# Patient Record
Sex: FEMALE | Employment: OTHER | URBAN - METROPOLITAN AREA
[De-identification: names, ages, dates, MRNs, and addresses within clinical notes are randomized per-mention and may not be internally consistent; named-entity substitution may affect disease eponyms.]

---

## 2024-03-29 ENCOUNTER — HOSPITAL ENCOUNTER (OUTPATIENT)
Facility: HOSPITAL | Age: 89
Setting detail: OBSERVATION
Discharge: HOME | End: 2024-03-30
Attending: EMERGENCY MEDICINE | Admitting: INTERNAL MEDICINE
Payer: MEDICARE

## 2024-03-29 ENCOUNTER — APPOINTMENT (OUTPATIENT)
Dept: RADIOLOGY | Facility: HOSPITAL | Age: 89
End: 2024-03-29
Payer: MEDICARE

## 2024-03-29 DIAGNOSIS — N39.0 LOWER URINARY TRACT INFECTIOUS DISEASE: Primary | ICD-10-CM

## 2024-03-29 DIAGNOSIS — K40.30 INGUINAL HERNIA OF RIGHT SIDE WITH OBSTRUCTION: ICD-10-CM

## 2024-03-29 DIAGNOSIS — R10.31 RIGHT LOWER QUADRANT ABDOMINAL PAIN: ICD-10-CM

## 2024-03-29 LAB
ALBUMIN SERPL BCP-MCNC: 3.6 G/DL (ref 3.4–5)
ALP SERPL-CCNC: 33 U/L (ref 33–136)
ALT SERPL W P-5'-P-CCNC: 12 U/L (ref 7–45)
ANION GAP SERPL CALC-SCNC: 12 MMOL/L (ref 10–20)
APPEARANCE UR: ABNORMAL
AST SERPL W P-5'-P-CCNC: 18 U/L (ref 9–39)
BACTERIA #/AREA URNS AUTO: ABNORMAL /HPF
BASOPHILS # BLD AUTO: 0.05 X10*3/UL (ref 0–0.1)
BASOPHILS NFR BLD AUTO: 0.5 %
BILIRUB SERPL-MCNC: 0.3 MG/DL (ref 0–1.2)
BILIRUB UR STRIP.AUTO-MCNC: NEGATIVE MG/DL
BUN SERPL-MCNC: 23 MG/DL (ref 6–23)
CALCIUM SERPL-MCNC: 8.1 MG/DL (ref 8.6–10.3)
CARDIAC TROPONIN I PNL SERPL HS: 10 NG/L (ref 0–13)
CHLORIDE SERPL-SCNC: 110 MMOL/L (ref 98–107)
CO2 SERPL-SCNC: 23 MMOL/L (ref 21–32)
COLOR UR: YELLOW
CREAT SERPL-MCNC: 0.93 MG/DL (ref 0.5–1.05)
EGFRCR SERPLBLD CKD-EPI 2021: 57 ML/MIN/1.73M*2
EOSINOPHIL # BLD AUTO: 0.09 X10*3/UL (ref 0–0.4)
EOSINOPHIL NFR BLD AUTO: 0.9 %
ERYTHROCYTE [DISTWIDTH] IN BLOOD BY AUTOMATED COUNT: 13.3 % (ref 11.5–14.5)
GLUCOSE SERPL-MCNC: 113 MG/DL (ref 74–99)
GLUCOSE UR STRIP.AUTO-MCNC: NEGATIVE MG/DL
HCT VFR BLD AUTO: 43 % (ref 36–46)
HGB BLD-MCNC: 13.7 G/DL (ref 12–16)
IMM GRANULOCYTES # BLD AUTO: 0.03 X10*3/UL (ref 0–0.5)
IMM GRANULOCYTES NFR BLD AUTO: 0.3 % (ref 0–0.9)
KETONES UR STRIP.AUTO-MCNC: NEGATIVE MG/DL
LEUKOCYTE ESTERASE UR QL STRIP.AUTO: ABNORMAL
LIPASE SERPL-CCNC: 29 U/L (ref 9–82)
LYMPHOCYTES # BLD AUTO: 1.99 X10*3/UL (ref 0.8–3)
LYMPHOCYTES NFR BLD AUTO: 19.8 %
MCH RBC QN AUTO: 29.8 PG (ref 26–34)
MCHC RBC AUTO-ENTMCNC: 31.9 G/DL (ref 32–36)
MCV RBC AUTO: 94 FL (ref 80–100)
MONOCYTES # BLD AUTO: 0.53 X10*3/UL (ref 0.05–0.8)
MONOCYTES NFR BLD AUTO: 5.3 %
NEUTROPHILS # BLD AUTO: 7.35 X10*3/UL (ref 1.6–5.5)
NEUTROPHILS NFR BLD AUTO: 73.2 %
NITRITE UR QL STRIP.AUTO: POSITIVE
NRBC BLD-RTO: 0 /100 WBCS (ref 0–0)
PH UR STRIP.AUTO: 7 [PH]
PLATELET # BLD AUTO: 228 X10*3/UL (ref 150–450)
POTASSIUM SERPL-SCNC: 4 MMOL/L (ref 3.5–5.3)
PROT SERPL-MCNC: 5.8 G/DL (ref 6.4–8.2)
PROT UR STRIP.AUTO-MCNC: NEGATIVE MG/DL
RBC # BLD AUTO: 4.59 X10*6/UL (ref 4–5.2)
RBC # UR STRIP.AUTO: ABNORMAL /UL
RBC #/AREA URNS AUTO: ABNORMAL /HPF
SODIUM SERPL-SCNC: 141 MMOL/L (ref 136–145)
SP GR UR STRIP.AUTO: 1.02
SQUAMOUS #/AREA URNS AUTO: ABNORMAL /HPF
UROBILINOGEN UR STRIP.AUTO-MCNC: <2 MG/DL
WBC # BLD AUTO: 10 X10*3/UL (ref 4.4–11.3)
WBC #/AREA URNS AUTO: ABNORMAL /HPF

## 2024-03-29 PROCEDURE — 2500000004 HC RX 250 GENERAL PHARMACY W/ HCPCS (ALT 636 FOR OP/ED)

## 2024-03-29 PROCEDURE — 99285 EMERGENCY DEPT VISIT HI MDM: CPT | Performed by: EMERGENCY MEDICINE

## 2024-03-29 PROCEDURE — 84484 ASSAY OF TROPONIN QUANT: CPT

## 2024-03-29 PROCEDURE — 71275 CT ANGIOGRAPHY CHEST: CPT | Performed by: RADIOLOGY

## 2024-03-29 PROCEDURE — 96375 TX/PRO/DX INJ NEW DRUG ADDON: CPT | Mod: 59

## 2024-03-29 PROCEDURE — 85025 COMPLETE CBC W/AUTO DIFF WBC: CPT

## 2024-03-29 PROCEDURE — 74174 CTA ABD&PLVS W/CONTRAST: CPT | Performed by: RADIOLOGY

## 2024-03-29 PROCEDURE — 87086 URINE CULTURE/COLONY COUNT: CPT | Mod: STJLAB

## 2024-03-29 PROCEDURE — 83690 ASSAY OF LIPASE: CPT

## 2024-03-29 PROCEDURE — 2550000001 HC RX 255 CONTRASTS: Performed by: EMERGENCY MEDICINE

## 2024-03-29 PROCEDURE — 81001 URINALYSIS AUTO W/SCOPE: CPT

## 2024-03-29 PROCEDURE — 71275 CT ANGIOGRAPHY CHEST: CPT

## 2024-03-29 PROCEDURE — 84075 ASSAY ALKALINE PHOSPHATASE: CPT

## 2024-03-29 PROCEDURE — 99285 EMERGENCY DEPT VISIT HI MDM: CPT | Mod: 25

## 2024-03-29 PROCEDURE — 36415 COLL VENOUS BLD VENIPUNCTURE: CPT

## 2024-03-29 RX ORDER — MORPHINE SULFATE 4 MG/ML
4 INJECTION, SOLUTION INTRAMUSCULAR; INTRAVENOUS ONCE
Status: COMPLETED | OUTPATIENT
Start: 2024-03-29 | End: 2024-03-29

## 2024-03-29 RX ORDER — HYDRALAZINE HYDROCHLORIDE 20 MG/ML
5 INJECTION INTRAMUSCULAR; INTRAVENOUS ONCE
Status: COMPLETED | OUTPATIENT
Start: 2024-03-29 | End: 2024-03-29

## 2024-03-29 RX ORDER — ONDANSETRON HYDROCHLORIDE 2 MG/ML
4 INJECTION, SOLUTION INTRAVENOUS ONCE
Status: COMPLETED | OUTPATIENT
Start: 2024-03-29 | End: 2024-03-29

## 2024-03-29 RX ORDER — HYDRALAZINE HYDROCHLORIDE 20 MG/ML
INJECTION INTRAMUSCULAR; INTRAVENOUS
Status: COMPLETED
Start: 2024-03-29 | End: 2024-03-29

## 2024-03-29 RX ADMIN — IOHEXOL 90 ML: 350 INJECTION, SOLUTION INTRAVENOUS at 22:32

## 2024-03-29 RX ADMIN — HYDRALAZINE HYDROCHLORIDE 5 MG: 20 INJECTION INTRAMUSCULAR; INTRAVENOUS at 22:25

## 2024-03-29 RX ADMIN — ONDANSETRON 4 MG: 2 INJECTION INTRAMUSCULAR; INTRAVENOUS at 22:09

## 2024-03-29 RX ADMIN — MORPHINE SULFATE 4 MG: 4 INJECTION, SOLUTION INTRAMUSCULAR; INTRAVENOUS at 22:08

## 2024-03-29 ASSESSMENT — PAIN SCALES - GENERAL
PAINLEVEL_OUTOF10: 9
PAINLEVEL_OUTOF10: 0 - NO PAIN
PAINLEVEL_OUTOF10: 9

## 2024-03-29 ASSESSMENT — LIFESTYLE VARIABLES
EVER HAD A DRINK FIRST THING IN THE MORNING TO STEADY YOUR NERVES TO GET RID OF A HANGOVER: NO
TOTAL SCORE: 0
EVER FELT BAD OR GUILTY ABOUT YOUR DRINKING: NO
HAVE YOU EVER FELT YOU SHOULD CUT DOWN ON YOUR DRINKING: NO
HAVE PEOPLE ANNOYED YOU BY CRITICIZING YOUR DRINKING: NO

## 2024-03-29 ASSESSMENT — PAIN DESCRIPTION - LOCATION
LOCATION: ABDOMEN
LOCATION: ABDOMEN

## 2024-03-29 ASSESSMENT — PAIN - FUNCTIONAL ASSESSMENT
PAIN_FUNCTIONAL_ASSESSMENT: 0-10
PAIN_FUNCTIONAL_ASSESSMENT: 0-10

## 2024-03-29 ASSESSMENT — COLUMBIA-SUICIDE SEVERITY RATING SCALE - C-SSRS
2. HAVE YOU ACTUALLY HAD ANY THOUGHTS OF KILLING YOURSELF?: NO
1. IN THE PAST MONTH, HAVE YOU WISHED YOU WERE DEAD OR WISHED YOU COULD GO TO SLEEP AND NOT WAKE UP?: NO
6. HAVE YOU EVER DONE ANYTHING, STARTED TO DO ANYTHING, OR PREPARED TO DO ANYTHING TO END YOUR LIFE?: NO

## 2024-03-29 ASSESSMENT — PAIN DESCRIPTION - ORIENTATION
ORIENTATION: RIGHT;LOWER
ORIENTATION: LOWER

## 2024-03-29 ASSESSMENT — PAIN DESCRIPTION - PAIN TYPE: TYPE: ACUTE PAIN

## 2024-03-30 VITALS
HEIGHT: 64 IN | RESPIRATION RATE: 18 BRPM | DIASTOLIC BLOOD PRESSURE: 72 MMHG | SYSTOLIC BLOOD PRESSURE: 172 MMHG | TEMPERATURE: 96.8 F | WEIGHT: 148.15 LBS | HEART RATE: 67 BPM | OXYGEN SATURATION: 92 % | BODY MASS INDEX: 25.29 KG/M2

## 2024-03-30 PROBLEM — R10.30 LOWER ABDOMINAL PAIN: Status: ACTIVE | Noted: 2024-03-30

## 2024-03-30 PROBLEM — N30.00 ACUTE CYSTITIS WITHOUT HEMATURIA: Status: ACTIVE | Noted: 2024-03-30

## 2024-03-30 PROBLEM — R53.1 WEAKNESS: Status: ACTIVE | Noted: 2024-03-30

## 2024-03-30 PROBLEM — R10.9 ABDOMINAL PAIN: Status: ACTIVE | Noted: 2024-03-30

## 2024-03-30 PROBLEM — K43.6 STRANGULATED HERNIA OF ABDOMINAL WALL: Status: ACTIVE | Noted: 2024-03-30

## 2024-03-30 LAB — HOLD SPECIMEN: NORMAL

## 2024-03-30 PROCEDURE — 99223 1ST HOSP IP/OBS HIGH 75: CPT | Performed by: INTERNAL MEDICINE

## 2024-03-30 PROCEDURE — 2500000004 HC RX 250 GENERAL PHARMACY W/ HCPCS (ALT 636 FOR OP/ED): Performed by: EMERGENCY MEDICINE

## 2024-03-30 PROCEDURE — 96365 THER/PROPH/DIAG IV INF INIT: CPT | Mod: 59

## 2024-03-30 PROCEDURE — 99239 HOSP IP/OBS DSCHRG MGMT >30: CPT | Performed by: INTERNAL MEDICINE

## 2024-03-30 PROCEDURE — C9113 INJ PANTOPRAZOLE SODIUM, VIA: HCPCS | Performed by: INTERNAL MEDICINE

## 2024-03-30 PROCEDURE — G0378 HOSPITAL OBSERVATION PER HR: HCPCS

## 2024-03-30 PROCEDURE — 96361 HYDRATE IV INFUSION ADD-ON: CPT | Mod: 59

## 2024-03-30 PROCEDURE — 2500000004 HC RX 250 GENERAL PHARMACY W/ HCPCS (ALT 636 FOR OP/ED): Performed by: INTERNAL MEDICINE

## 2024-03-30 RX ORDER — METOPROLOL SUCCINATE 50 MG/1
50 TABLET, EXTENDED RELEASE ORAL DAILY
COMMUNITY

## 2024-03-30 RX ORDER — ONDANSETRON 4 MG/1
4 TABLET, ORALLY DISINTEGRATING ORAL EVERY 8 HOURS PRN
Status: DISCONTINUED | OUTPATIENT
Start: 2024-03-30 | End: 2024-03-30 | Stop reason: HOSPADM

## 2024-03-30 RX ORDER — METOCLOPRAMIDE 10 MG/1
5 TABLET ORAL EVERY 6 HOURS PRN
Status: DISCONTINUED | OUTPATIENT
Start: 2024-03-30 | End: 2024-03-30 | Stop reason: HOSPADM

## 2024-03-30 RX ORDER — PANTOPRAZOLE SODIUM 40 MG/1
40 TABLET, DELAYED RELEASE ORAL
Status: DISCONTINUED | OUTPATIENT
Start: 2024-03-30 | End: 2024-03-30 | Stop reason: HOSPADM

## 2024-03-30 RX ORDER — PANTOPRAZOLE SODIUM 40 MG/10ML
40 INJECTION, POWDER, LYOPHILIZED, FOR SOLUTION INTRAVENOUS
Status: DISCONTINUED | OUTPATIENT
Start: 2024-03-30 | End: 2024-03-30 | Stop reason: HOSPADM

## 2024-03-30 RX ORDER — HYDROGEN PEROXIDE 3 %
20 SOLUTION, NON-ORAL MISCELLANEOUS
COMMUNITY

## 2024-03-30 RX ORDER — POLYETHYLENE GLYCOL AND PROPYLENE GLYCOL 4; 3 MG/ML; MG/ML
1 SOLUTION/ DROPS OPHTHALMIC DAILY
COMMUNITY

## 2024-03-30 RX ORDER — POLYETHYLENE GLYCOL 3350 17 G/17G
17 POWDER, FOR SOLUTION ORAL DAILY PRN
Status: DISCONTINUED | OUTPATIENT
Start: 2024-03-30 | End: 2024-03-30 | Stop reason: HOSPADM

## 2024-03-30 RX ORDER — TALC
3 POWDER (GRAM) TOPICAL NIGHTLY PRN
Status: DISCONTINUED | OUTPATIENT
Start: 2024-03-30 | End: 2024-03-30 | Stop reason: HOSPADM

## 2024-03-30 RX ORDER — LISINOPRIL 40 MG/1
40 TABLET ORAL DAILY
COMMUNITY

## 2024-03-30 RX ORDER — SODIUM CHLORIDE 9 MG/ML
75 INJECTION, SOLUTION INTRAVENOUS CONTINUOUS
Status: DISCONTINUED | OUTPATIENT
Start: 2024-03-30 | End: 2024-03-30 | Stop reason: HOSPADM

## 2024-03-30 RX ORDER — CEFTRIAXONE 1 G/50ML
1 INJECTION, SOLUTION INTRAVENOUS ONCE
Status: COMPLETED | OUTPATIENT
Start: 2024-03-30 | End: 2024-03-30

## 2024-03-30 RX ORDER — LEVOTHYROXINE SODIUM 50 UG/1
50 TABLET ORAL
COMMUNITY

## 2024-03-30 RX ORDER — ESTRADIOL 0.1 MG/G
2 CREAM VAGINAL 2 TIMES WEEKLY
COMMUNITY

## 2024-03-30 RX ORDER — ACETAMINOPHEN 160 MG/5ML
650 SOLUTION ORAL EVERY 4 HOURS PRN
Status: DISCONTINUED | OUTPATIENT
Start: 2024-03-30 | End: 2024-03-30 | Stop reason: HOSPADM

## 2024-03-30 RX ORDER — ACETAMINOPHEN 650 MG/1
650 SUPPOSITORY RECTAL EVERY 4 HOURS PRN
Status: DISCONTINUED | OUTPATIENT
Start: 2024-03-30 | End: 2024-03-30 | Stop reason: HOSPADM

## 2024-03-30 RX ORDER — TRAMADOL HYDROCHLORIDE 50 MG/1
50 TABLET ORAL EVERY 8 HOURS PRN
Qty: 10 TABLET | Refills: 0 | Status: SHIPPED | OUTPATIENT
Start: 2024-03-30

## 2024-03-30 RX ORDER — ONDANSETRON HYDROCHLORIDE 2 MG/ML
4 INJECTION, SOLUTION INTRAVENOUS ONCE AS NEEDED
Status: DISCONTINUED | OUTPATIENT
Start: 2024-03-30 | End: 2024-03-30 | Stop reason: HOSPADM

## 2024-03-30 RX ORDER — TITANIUM DIOXIDE, OCTINOXATE, ZINC OXIDE 4.61; 1.6; .78 G/40ML; G/40ML; G/40ML
1 CREAM TOPICAL 2 TIMES DAILY
COMMUNITY

## 2024-03-30 RX ORDER — POLYETHYLENE GLYCOL 3350 17 G/17G
17 POWDER, FOR SOLUTION ORAL DAILY
Qty: 30 PACKET | Refills: 0 | Status: SHIPPED | OUTPATIENT
Start: 2024-03-30 | End: 2024-04-29

## 2024-03-30 RX ORDER — MORPHINE SULFATE 2 MG/ML
2 INJECTION, SOLUTION INTRAMUSCULAR; INTRAVENOUS EVERY 4 HOURS PRN
Status: DISCONTINUED | OUTPATIENT
Start: 2024-03-30 | End: 2024-03-30 | Stop reason: HOSPADM

## 2024-03-30 RX ORDER — CEFDINIR 300 MG/1
300 CAPSULE ORAL 2 TIMES DAILY
Qty: 10 CAPSULE | Refills: 0 | Status: SHIPPED | OUTPATIENT
Start: 2024-03-30 | End: 2024-04-04

## 2024-03-30 RX ORDER — ATORVASTATIN CALCIUM 20 MG/1
20 TABLET, FILM COATED ORAL EVERY EVENING
COMMUNITY

## 2024-03-30 RX ORDER — ACETAMINOPHEN 325 MG/1
650 TABLET ORAL EVERY 8 HOURS PRN
Qty: 30 TABLET | Refills: 0 | Status: CANCELLED | OUTPATIENT
Start: 2024-03-30

## 2024-03-30 RX ORDER — ACETAMINOPHEN 500 MG
500 TABLET ORAL EVERY 8 HOURS PRN
COMMUNITY

## 2024-03-30 RX ORDER — ACETAMINOPHEN 325 MG/1
650 TABLET ORAL EVERY 4 HOURS PRN
Status: DISCONTINUED | OUTPATIENT
Start: 2024-03-30 | End: 2024-03-30 | Stop reason: HOSPADM

## 2024-03-30 RX ORDER — ZINC OXIDE 200 MG/G
1 OINTMENT TOPICAL AS NEEDED
COMMUNITY

## 2024-03-30 RX ORDER — ONDANSETRON HYDROCHLORIDE 2 MG/ML
4 INJECTION, SOLUTION INTRAVENOUS EVERY 8 HOURS PRN
Status: DISCONTINUED | OUTPATIENT
Start: 2024-03-30 | End: 2024-03-30 | Stop reason: HOSPADM

## 2024-03-30 RX ORDER — CEFTRIAXONE 1 G/50ML
1 INJECTION, SOLUTION INTRAVENOUS EVERY 24 HOURS
Status: DISCONTINUED | OUTPATIENT
Start: 2024-03-30 | End: 2024-03-30 | Stop reason: HOSPADM

## 2024-03-30 RX ORDER — METOCLOPRAMIDE HYDROCHLORIDE 5 MG/ML
5 INJECTION INTRAMUSCULAR; INTRAVENOUS EVERY 6 HOURS PRN
Status: DISCONTINUED | OUTPATIENT
Start: 2024-03-30 | End: 2024-03-30 | Stop reason: HOSPADM

## 2024-03-30 RX ORDER — ALBUTEROL SULFATE 90 UG/1
2 AEROSOL, METERED RESPIRATORY (INHALATION) 3 TIMES DAILY PRN
COMMUNITY

## 2024-03-30 RX ORDER — DICLOFENAC SODIUM 10 MG/G
4 GEL TOPICAL 4 TIMES DAILY PRN
COMMUNITY

## 2024-03-30 RX ADMIN — PANTOPRAZOLE SODIUM 40 MG: 40 INJECTION, POWDER, FOR SOLUTION INTRAVENOUS at 05:27

## 2024-03-30 RX ADMIN — SODIUM CHLORIDE 75 ML/HR: 9 INJECTION, SOLUTION INTRAVENOUS at 05:26

## 2024-03-30 RX ADMIN — CEFTRIAXONE SODIUM 1 G: 1 INJECTION, SOLUTION INTRAVENOUS at 02:15

## 2024-03-30 SDOH — SOCIAL STABILITY: SOCIAL INSECURITY: HAS ANYONE EVER THREATENED TO HURT YOUR FAMILY OR YOUR PETS?: NO

## 2024-03-30 SDOH — SOCIAL STABILITY: SOCIAL INSECURITY: ARE THERE ANY APPARENT SIGNS OF INJURIES/BEHAVIORS THAT COULD BE RELATED TO ABUSE/NEGLECT?: NO

## 2024-03-30 SDOH — SOCIAL STABILITY: SOCIAL INSECURITY: DO YOU FEEL ANYONE HAS EXPLOITED OR TAKEN ADVANTAGE OF YOU FINANCIALLY OR OF YOUR PERSONAL PROPERTY?: NO

## 2024-03-30 SDOH — SOCIAL STABILITY: SOCIAL INSECURITY: WERE YOU ABLE TO COMPLETE ALL THE BEHAVIORAL HEALTH SCREENINGS?: YES

## 2024-03-30 SDOH — SOCIAL STABILITY: SOCIAL INSECURITY: ARE YOU OR HAVE YOU BEEN THREATENED OR ABUSED PHYSICALLY, EMOTIONALLY, OR SEXUALLY BY ANYONE?: NO

## 2024-03-30 SDOH — SOCIAL STABILITY: SOCIAL INSECURITY: ABUSE: ADULT

## 2024-03-30 SDOH — SOCIAL STABILITY: SOCIAL INSECURITY: DOES ANYONE TRY TO KEEP YOU FROM HAVING/CONTACTING OTHER FRIENDS OR DOING THINGS OUTSIDE YOUR HOME?: NO

## 2024-03-30 SDOH — SOCIAL STABILITY: SOCIAL INSECURITY: HAVE YOU HAD THOUGHTS OF HARMING ANYONE ELSE?: NO

## 2024-03-30 SDOH — SOCIAL STABILITY: SOCIAL INSECURITY: DO YOU FEEL UNSAFE GOING BACK TO THE PLACE WHERE YOU ARE LIVING?: NO

## 2024-03-30 ASSESSMENT — COGNITIVE AND FUNCTIONAL STATUS - GENERAL
MOVING TO AND FROM BED TO CHAIR: A LITTLE
HELP NEEDED FOR BATHING: A LOT
DRESSING REGULAR UPPER BODY CLOTHING: A LITTLE
WALKING IN HOSPITAL ROOM: A LITTLE
DRESSING REGULAR LOWER BODY CLOTHING: A LITTLE
EATING MEALS: A LITTLE
MOBILITY SCORE: 23
DAILY ACTIVITIY SCORE: 24
STANDING UP FROM CHAIR USING ARMS: A LITTLE
DAILY ACTIVITIY SCORE: 17
WALKING IN HOSPITAL ROOM: A LITTLE
MOBILITY SCORE: 23
DAILY ACTIVITIY SCORE: 24
CLIMB 3 TO 5 STEPS WITH RAILING: A LITTLE
TURNING FROM BACK TO SIDE WHILE IN FLAT BAD: A LITTLE
TOILETING: A LITTLE
MOVING FROM LYING ON BACK TO SITTING ON SIDE OF FLAT BED WITH BEDRAILS: A LITTLE
MOBILITY SCORE: 18
PATIENT BASELINE BEDBOUND: NO
PERSONAL GROOMING: A LITTLE
WALKING IN HOSPITAL ROOM: A LITTLE

## 2024-03-30 ASSESSMENT — ACTIVITIES OF DAILY LIVING (ADL)
JUDGMENT_ADEQUATE_SAFELY_COMPLETE_DAILY_ACTIVITIES: YES
ADEQUATE_TO_COMPLETE_ADL: YES
HEARING - RIGHT EAR: HEARING AID
LACK_OF_TRANSPORTATION: NO
ASSISTIVE_DEVICE: WALKER
HEARING - LEFT EAR: HEARING AID
DRESSING YOURSELF: INDEPENDENT
TOILETING: INDEPENDENT
GROOMING: INDEPENDENT
FEEDING YOURSELF: INDEPENDENT
BATHING: INDEPENDENT
WALKS IN HOME: INDEPENDENT
PATIENT'S MEMORY ADEQUATE TO SAFELY COMPLETE DAILY ACTIVITIES?: YES

## 2024-03-30 ASSESSMENT — LIFESTYLE VARIABLES
HOW OFTEN DO YOU HAVE A DRINK CONTAINING ALCOHOL: NEVER
SKIP TO QUESTIONS 9-10: 1
AUDIT-C TOTAL SCORE: 0
HOW OFTEN DO YOU HAVE 6 OR MORE DRINKS ON ONE OCCASION: NEVER
SUBSTANCE_ABUSE_PAST_12_MONTHS: NO
PRESCIPTION_ABUSE_PAST_12_MONTHS: NO
HOW MANY STANDARD DRINKS CONTAINING ALCOHOL DO YOU HAVE ON A TYPICAL DAY: PATIENT DOES NOT DRINK
AUDIT-C TOTAL SCORE: 0

## 2024-03-30 ASSESSMENT — PAIN SCALES - GENERAL
PAINLEVEL_OUTOF10: 0 - NO PAIN

## 2024-03-30 ASSESSMENT — PATIENT HEALTH QUESTIONNAIRE - PHQ9
SUM OF ALL RESPONSES TO PHQ9 QUESTIONS 1 & 2: 0
2. FEELING DOWN, DEPRESSED OR HOPELESS: NOT AT ALL
1. LITTLE INTEREST OR PLEASURE IN DOING THINGS: NOT AT ALL

## 2024-03-30 ASSESSMENT — PAIN SCALES - WONG BAKER: WONGBAKER_NUMERICALRESPONSE: NO HURT

## 2024-03-30 ASSESSMENT — PAIN - FUNCTIONAL ASSESSMENT: PAIN_FUNCTIONAL_ASSESSMENT: 0-10

## 2024-03-30 NOTE — ED NOTES
Pt ambulated to the bathroom using a walker. This RN stayed close by. Pt was very steady.     Andreea Zavala RN  03/29/24 [id number]

## 2024-03-30 NOTE — HOSPITAL COURSE
89-year-old female with past medical history of atrial fibrillation, hypertension, history of TIA, hypothyroidism, inguinal hernia presented to emergency department for abdominal pain.  Patient was found to have strangulated inguinal hernia that was reduced by ER.  Patient was admitted to hospitalist service.  She did not want any surgical or aggressive intervention.  She is feeling better and passing gas.  Patient and son Austin are at bedside and they want her going back to independent living facility and they will look into assisted living if patient has recurrence of her symptoms.  Patient is currently in stable condition we are prescribing her bowel regimen along with hernia belt and as needed pain medications.  She will follow-up with PCP as outpatient.    35 minutes spent in discharge timing

## 2024-03-30 NOTE — H&P
History Of Present Illness  Betty Griffin is a 89 y.o. female presenting with chief complaint of epigastric abdominal pain ongoing for the last 8 hours.  Pain is described as sharp, stabbing, and localized to the epigastric region with some additional reference to the left lower quadrant.  She endorses shortness of breath, loss of appetite, nausea without emesis.  Upon arrival to the emergency department, evidence of a right inguinal hernia was clearly noted with concern for incarceration.  CT scan of the abdomen obtained confirms evidence of a right incarcerated inguinal hernia.  Additional incidental findings as noted in CT report.  These findings were discussed with the patient and her son at bedside by ED physicians.  Patient is a confirmed DNR CC.  She stated that she would not consent to surgery in this scenario.  In discussion with patient's son, ED physicians report that the greatest concern at this time is transitional care as she is currently in an independent living and may no longer be appropriate for that setting.  Patient admitted for further assessment.     Past Medical History  No past medical history on file.    Surgical History  No past surgical history on file.     Social History  She has no history on file for tobacco use, alcohol use, and drug use.    Family History  No family history on file.     Allergies  Patient has no known allergies.    Review of Systems   All other systems reviewed and are negative.       Physical Exam  Vitals reviewed.   Constitutional:       Appearance: Normal appearance.   HENT:      Head: Normocephalic and atraumatic.      Nose: Nose normal.      Mouth/Throat:      Mouth: Mucous membranes are moist.   Eyes:      Extraocular Movements: Extraocular movements intact.      Conjunctiva/sclera: Conjunctivae normal.      Pupils: Pupils are equal, round, and reactive to light.   Cardiovascular:      Rate and Rhythm: Normal rate and regular rhythm.      Pulses: Normal  "pulses.      Heart sounds: Normal heart sounds.   Pulmonary:      Effort: Pulmonary effort is normal.      Breath sounds: Normal breath sounds.   Abdominal:      General: Bowel sounds are normal.      Palpations: Abdomen is soft.   Musculoskeletal:         General: Normal range of motion.      Cervical back: Normal range of motion and neck supple.   Skin:     General: Skin is warm and dry.   Neurological:      General: No focal deficit present.      Mental Status: She is alert. Mental status is at baseline.   Psychiatric:         Mood and Affect: Mood normal.         Behavior: Behavior normal.          Last Recorded Vitals  Blood pressure 142/60, pulse 61, temperature 37 °C (98.6 °F), resp. rate 20, height 1.626 m (5' 4\"), weight 65.8 kg (145 lb), SpO2 94 %.    Relevant Results  Scheduled medications  cefTRIAXone, 1 g, intravenous, q24h  pantoprazole, 40 mg, oral, Daily before breakfast   Or  pantoprazole, 40 mg, intravenous, Daily before breakfast      Continuous medications  sodium chloride 0.9%, 75 mL/hr, Last Rate: 75 mL/hr (03/30/24 0526)      PRN medications  PRN medications: acetaminophen **OR** acetaminophen **OR** acetaminophen, acetaminophen **OR** acetaminophen **OR** acetaminophen, melatonin, metoclopramide **OR** metoclopramide, morphine, ondansetron ODT **OR** ondansetron, ondansetron, polyethylene glycol  CT angio chest abdomen pelvis    Result Date: 3/29/2024  Interpreted By:  Jim Sanchez, STUDY: CT ANGIO CHEST ABDOMEN PELVIS;  3/29/2024 10:56 pm   INDICATION: Signs/Symptoms:abd pain radiating to back with associated chest pain. eval for dissection   COMPARISON: None.   ACCESSION NUMBER(S): [id number]   ORDERING CLINICIAN: VIRAJ MOLINA   TECHNIQUE: CT of the chest, abdomen, and pelvis was performed. Contiguous axial images were obtained through the chest, abdomen and pelvis.  Coronal and sagittal reconstructions were performed. Noncontrast images were acquired followed by arterial phase " images. MIP/3D reconstructions were performed on a separate workstation and provided for interpretation. Intravenous contrast was administered, 90 mL Omnipaque 350.   FINDINGS:   CHEST:   LOWER NECK AND CHEST WALL:  Within normal limits. MEDIASTINUM/DANNIE:No lymphadenopathy. Mild distal esophageal wall thickening. Moderate-sized hiatal hernia. CARDIOVASCULAR:  Borderline biatrial enlargement. No pericardial effusion. No aortic dissection or aneurysm. There is moderate calcified and noncalcified atherosclerotic plaque predominantly involving the descending thoracic aorta. The main pulmonary artery is borderline enlarged measuring 3.2 cm in diameter which can be seen with pulmonary hypertension. Subtle web-like filling defect within the right distal lower lobar pulmonary artery (axial image 177/636). Additional potential web-like filling defect within a subsegmental branching point of the medial right lower lobe (axial image 211/636). No large central acute pulmonary embolism. LUNGS, AIRWAYS, AND PLEURA:  Patchy dependent airspace opacities within the right lung apex. Mild dependent hypoventilatory change. Scattered pulmonary nodules measuring up to 0.7 cm. The trachea and central airways are patent. MUSCULOSKELETAL: No acute osseous abnormality or suspicious osseous lesions. Moderate multilevel spinal degenerative change. Mild chronic appearing height loss of T6, T7, and T8.       ABDOMEN:   LIVER: Normal. BILE DUCTS: Dilated biliary tree which may be secondary to prior cholecystectomy. GALLBLADDER: Cholecystectomy. PANCREAS: Mildly prominent pancreatic duct. Clustered cystic lesion or lesions within the pancreatic tail measuring approximately 3 x 1.9 cm. SPLEEN: Within normal limits. ADRENALS: Within normal limits. KIDNEYS, URETERS, and BLADDER: There is mild right hydronephrosis. Numerous left parapelvic renal cysts. Ureters are non-dilated. Trace nondependent gas within the bladder lumen. REPRODUCTIVE: No pelvic  masses. VESSELS: Moderate calcified and noncalcified atherosclerotic plaque. There is severe focal narrowing of the celiac artery at the median arcuate ligament. The distal celiac branches are patent. Superior mesenteric artery is patent. There are 3 right renal arteries. There are 2 left renal arteries. The infrarenal abdominal aorta is tortuous. No abdominal aortic aneurysm or dissection. Bilateral iliac and visualized proximal femoral arterial structures are patent. RETROPERITONEUM and LYMPH NODES: No pathologically enlarged lymph nodes. BOWEL: Moderate-sized hiatal hernia. High-density material within the gastric lumen. The small bowel is dilated and there is a focal transition point within a right inguinal hernia which is immediately superolateral to the inferior epigastric artery. The loop of small bowel within the hernia sac demonstrates severe wall thickening and surrounding fluid compatible with strangulation.The distal small bowel is mildly distended and there is mild wall thickening of the small bowel just distal to the hernia sac. The distal appendix is dilated measuring 1.3 cm with some nodularity of the appendiceal walls. There is moderate diffuse colonic wall thickening. There is rectal prolapse. PERITONEUM: No ascites or free air, no fluid collection. BODY WALL: Small fat containing periumbilical hernia. Incarcerated right inguinal hernia as above. MUSCULOSKELETAL: No acute osseous abnormality or suspicious osseous lesions. There is mild bilateral hip osteoarthritis. Severe multilevel spinal degenerative change. There is lumbar dextroscoliosis. There is right lateral translation of L3 on L4.       Chest: 1. No aortic dissection or aneurysm. Severe atherosclerosis. 2. Small bowel obstruction secondary to strangulated right inguinal hernia as above. The hernia is immediately superolateral to the inferior epigastric artery. Surgical evaluation is recommended. 3. The distal appendix is dilated measuring  1.3 cm with some nodularity of the appendiceal walls. A malignant process is not excluded. Surgical evaluation may be considered. Appearance is not typical for appendicitis. 4. Patchy airspace opacity within the posterior right upper lobe, possibly reflecting pneumonia. 5. Web-like filling defects within the right lower lobe which may reflect chronic thromboembolic disease. Borderline dilated pulmonary artery which can be seen with pulmonary hypertension. Findings may be seen with chronic thromboembolic pulmonary hypertension. 6.  Dilated biliary tree which may be secondary to prior cholecystectomy. A component of acute variably obstruction is not excluded and correlation with LFTs is recommended. 7. Scattered pulmonary nodules measuring up to 0.7 cm. 8. Clustered cystic lesion or lesions within the pancreatic tail measuring approximately 3 x 1.9 cm. Follow-up MRI is recommended for further evaluation.   MACRO: This critical finding was communicated by Dr. Sanchez to VIRAJ MOLINA at 23:433/29/2024 with read back verification.   Signed by: Jim Sanchez 3/29/2024 11:44 PM Dictation workstation:   [id number]   Results for orders placed or performed during the hospital encounter of 03/29/24 (from the past 24 hour(s))   CBC and Auto Differential   Result Value Ref Range    WBC 10.0 4.4 - 11.3 x10*3/uL    nRBC 0.0 0.0 - 0.0 /100 WBCs    RBC 4.59 4.00 - 5.20 x10*6/uL    Hemoglobin 13.7 12.0 - 16.0 g/dL    Hematocrit 43.0 36.0 - 46.0 %    MCV 94 80 - 100 fL    MCH 29.8 26.0 - 34.0 pg    MCHC 31.9 (L) 32.0 - 36.0 g/dL    RDW 13.3 11.5 - 14.5 %    Platelets 228 150 - 450 x10*3/uL    Neutrophils % 73.2 40.0 - 80.0 %    Immature Granulocytes %, Automated 0.3 0.0 - 0.9 %    Lymphocytes % 19.8 13.0 - 44.0 %    Monocytes % 5.3 2.0 - 10.0 %    Eosinophils % 0.9 0.0 - 6.0 %    Basophils % 0.5 0.0 - 2.0 %    Neutrophils Absolute 7.35 (H) 1.60 - 5.50 x10*3/uL    Immature Granulocytes Absolute, Automated 0.03 0.00 - 0.50  x10*3/uL    Lymphocytes Absolute 1.99 0.80 - 3.00 x10*3/uL    Monocytes Absolute 0.53 0.05 - 0.80 x10*3/uL    Eosinophils Absolute 0.09 0.00 - 0.40 x10*3/uL    Basophils Absolute 0.05 0.00 - 0.10 x10*3/uL   Comprehensive metabolic panel   Result Value Ref Range    Glucose 113 (H) 74 - 99 mg/dL    Sodium 141 136 - 145 mmol/L    Potassium 4.0 3.5 - 5.3 mmol/L    Chloride 110 (H) 98 - 107 mmol/L    Bicarbonate 23 21 - 32 mmol/L    Anion Gap 12 10 - 20 mmol/L    Urea Nitrogen 23 6 - 23 mg/dL    Creatinine 0.93 0.50 - 1.05 mg/dL    eGFR 57 (L) >60 mL/min/1.73m*2    Calcium 8.1 (L) 8.6 - 10.3 mg/dL    Albumin 3.6 3.4 - 5.0 g/dL    Alkaline Phosphatase 33 33 - 136 U/L    Total Protein 5.8 (L) 6.4 - 8.2 g/dL    AST 18 9 - 39 U/L    Bilirubin, Total 0.3 0.0 - 1.2 mg/dL    ALT 12 7 - 45 U/L   Lipase   Result Value Ref Range    Lipase 29 9 - 82 U/L   Troponin I, High Sensitivity   Result Value Ref Range    Troponin I, High Sensitivity 10 0 - 13 ng/L   Urinalysis with Reflex Culture and Microscopic   Result Value Ref Range    Color, Urine Yellow Straw, Yellow    Appearance, Urine Hazy (N) Clear    Specific Gravity, Urine 1.025 1.005 - 1.035    pH, Urine 7.0 5.0, 5.5, 6.0, 6.5, 7.0, 7.5, 8.0    Protein, Urine NEGATIVE NEGATIVE mg/dL    Glucose, Urine NEGATIVE NEGATIVE mg/dL    Blood, Urine SMALL (1+) (A) NEGATIVE    Ketones, Urine NEGATIVE NEGATIVE mg/dL    Bilirubin, Urine NEGATIVE NEGATIVE    Urobilinogen, Urine <2.0 <2.0 mg/dL    Nitrite, Urine POSITIVE (A) NEGATIVE    Leukocyte Esterase, Urine SMALL (1+) (A) NEGATIVE   Microscopic Only, Urine   Result Value Ref Range    WBC, Urine 11-20 (A) 1-5, NONE /HPF    RBC, Urine 6-10 (A) NONE, 1-2, 3-5 /HPF    Squamous Epithelial Cells, Urine 1-9 (SPARSE) Reference range not established. /HPF    Bacteria, Urine 1+ (A) NONE SEEN /HPF     Patient presents with approximately 8 hours     Assessment/Plan   Principal Problem:    Strangulated hernia of abdominal wall  Active Problems:     Lower abdominal pain    Acute cystitis without hematuria    Weakness    Abdominal pain      Patient presents with approximately 8 hours of epigastric and left lower quadrant abdominal pain with CT scan identifying evidence of an incarcerated/strangulated right inguinal hernia.  This hernia has been reduced in the emergency department and patient reports resolution of the previously noted discomfort.    Patient is a confirmed DNR CC.  This was confirmed per ED report by her son.  Paperwork also present at time of admission.  For this reason, surgical consultation was deferred.    In the absence of surgical intervention pain control and discharge planning are priority at this time.  She is resting comfortably and in no acute distress.  However she is concerned in the event that this happens again.  Consultation placed for palliative care service to help with transitional management.  In addition, requesting the assistance of case management/PT/OT as family is concerned that she may not be appropriate to return to her independent living environment.    Home medications to be reviewed and resumed as indicated    Fall precautions pending PT/OT evaluation     I spent >75 minutes in the professional and overall care of this patient.      Kwasi Reynoso, DO

## 2024-03-30 NOTE — NURSING NOTE
Patient discharge orders and follow ups gone over with patient and patient family members. Verbal understand of orders given. Abdominal hernia binder belt provided per MD order. Vital signs stable and no complaint of pain. Patient will be picked up by family and transported back to independent living.

## 2024-03-30 NOTE — ED PROVIDER NOTES
EMERGENCY DEPARTMENT ENCOUNTER      Pt Name: Betty [place]  MRN: [id number]  Birthdate 9/13/1928  Date of evaluation: 3/29/2024  Provider: Benito Wright DO    CHIEF COMPLAINT       Chief Complaint   Patient presents with    Abdominal Pain         HISTORY OF PRESENT ILLNESS    HPI  Betty is a 89-year-old female who presents to the ED with complaint of abdominal pain.  Medical history is significant for bilateral inguinal hernia, esophageal stricture, chronic kidney disease, recurrent UTI, hypertension, hyperlipidemia, atrial fibrillation, hypothyroidism, TIA, and atherosclerosis left carotid to the abdomen.  She states that the pain started about 3 hours ago, it is sharp, localized to the left lower quadrant with 9 out of 10 in severity.  She reports that symptom is associated with shortness of breath, general body ache, and weakness.  She denies headache, dizziness, lightheadedness, fever, chills, chest pain, nausea, vomiting, diarrhea, or urinary symptoms.        Nursing Notes were reviewed.    PAST MEDICAL HISTORY   History reviewed. No pertinent past medical history.      SURGICAL HISTORY     History reviewed. No pertinent surgical history.      CURRENT MEDICATIONS       Discharge Medication List as of 3/30/2024  1:18 PM        CONTINUE these medications which have NOT CHANGED    Details   acetaminophen (Tylenol) 500 mg tablet Take 1 tablet (500 mg) by mouth every 8 hours if needed for mild pain (1 - 3) or headaches., Historical Med      albuterol 90 mcg/actuation inhaler Inhale 2 puffs 3 times a day as needed for shortness of breath., Historical Med      atorvastatin (Lipitor) 20 mg tablet Take 1 tablet (20 mg) by mouth once daily in the evening., Historical Med      cranberry 400 mg capsule Take 1 capsule by mouth 2 times a day., Historical Med      diclofenac sodium (Voltaren) 1 % gel Apply 4.5 inches (4 g) topically 4 times a day as needed (leg pain)., Historical Med      esomeprazole (NexIUM) 20 mg   capsule Take 1 capsule (20 mg) by mouth once daily in the morning. Take before meals. Do not open capsule., Historical Med      estradiol (Estrace) 0.01 % (0.1 mg/gram) vaginal cream Insert 0.5 Applicatorfuls (2 g) into the vagina 2 times a week., Historical Med      lactobacillus acidophilus & bulgar (Lactinex) 1 million cell chewable tablet Chew 1 tablet once daily., Historical Med      levothyroxine (Synthroid, Levoxyl) 50 mcg tablet Take 1 tablet (50 mcg) by mouth once daily in the morning. Take before meals., Historical Med      lisinopril 40 mg tablet Take 1 tablet (40 mg) by mouth once daily., Historical Med      liver oil-zinc oxide (Desitin) ointment Apply 1 Application topically if needed for irritation., Historical Med      metoprolol succinate XL (Toprol-XL) 50 mg 24 hr tablet Take 1 tablet (50 mg) by mouth once daily. Do not crush or chew., Historical Med      multivit-min/iron/folic/vit K1 (CENTRUM CHEWABLES ORAL) Take 1 tablet by mouth once daily., Historical Med      peg 400-propylene glycol, PF, (Systane, PF,) 0.4-0.3 % dropperette Administer 1 drop into affected eye(s) once daily., Historical Med      rivaroxaban (Xarelto) 15 mg tablet Take 1 tablet (15 mg) by mouth once daily in the morning. Take with food., Historical Med             ALLERGIES     Patient has no known allergies.    FAMILY HISTORY     No family history on file.       SOCIAL HISTORY       Social History     Socioeconomic History    Marital status:      Spouse name: None    Number of children: None    Years of education: None    Highest education level: None   Occupational History    None   Tobacco Use    Smoking status: Never     Passive exposure: Never    Smokeless tobacco: Never   Vaping Use    Vaping Use: Never used   Substance and Sexual Activity    Alcohol use: Never    Drug use: Never    Sexual activity: Defer   Other Topics Concern    None   Social History Narrative    None     Social Determinants of Health      Financial Resource Strain: Low Risk  (3/30/2024)    Overall Financial Resource Strain (CARDIA)     Difficulty of Paying Living Expenses: Not hard at all   Food Insecurity: Not on file   Transportation Needs: No Transportation Needs (3/30/2024)    PRAPARE - Transportation     Lack of Transportation (Medical): No     Lack of Transportation (Non-Medical): No   Physical Activity: Not on file   Stress: Not on file   Social Connections: Not on file   Intimate Partner Violence: Not on file   Housing Stability: Low Risk  (3/30/2024)    Housing Stability Vital Sign     Unable to Pay for Housing in the Last Year: No     Number of Places Lived in the Last Year: 1     Unstable Housing in the Last Year: No       SCREENINGS                        PHYSICAL EXAM    (up to 7 for level 4, 8 or more for level 5)     ED Triage Vitals   Temp Pulse Resp BP   -- -- -- --      SpO2 Temp src Heart Rate Source Patient Position   -- -- -- --      BP Location FiO2 (%)     -- --       Physical Exam  Vitals reviewed.   Constitutional:       General: She is not in acute distress.     Appearance: Normal appearance. She is normal weight. She is not ill-appearing, toxic-appearing or diaphoretic.   HENT:      Head: Normocephalic and atraumatic.      Right Ear: External ear normal.      Nose: Nose normal. No congestion or rhinorrhea.      Mouth/Throat:      Mouth: Mucous membranes are moist.      Pharynx: Oropharynx is clear. No oropharyngeal exudate or posterior oropharyngeal erythema.   Eyes:      General: No scleral icterus.        Right eye: No discharge.         Left eye: No discharge.      Extraocular Movements: Extraocular movements intact.      Conjunctiva/sclera: Conjunctivae normal.      Pupils: Pupils are equal, round, and reactive to light.   Cardiovascular:      Rate and Rhythm: Normal rate and regular rhythm.      Pulses: Normal pulses.      Heart sounds: Normal heart sounds. No murmur heard.     No friction rub. No gallop.    Pulmonary:      Effort: Pulmonary effort is normal. No respiratory distress.      Breath sounds: Normal breath sounds. No stridor. No wheezing, rhonchi or rales.   Abdominal:      General: Abdomen is flat. Bowel sounds are normal. There is no distension.      Palpations: Abdomen is soft. There is mass.      Tenderness: There is abdominal tenderness (Right and left lower quadrants) in the right lower quadrant and left lower quadrant. There is no guarding or rebound.      Hernia: A hernia (Right inguinal) is present.   Musculoskeletal:         General: Tenderness (Left distal femur/medial knee) present. No swelling, deformity or signs of injury. Normal range of motion.      Cervical back: Normal range of motion and neck supple. No rigidity.      Right lower leg: No edema.      Left lower leg: No edema.   Skin:     General: Skin is warm.      Capillary Refill: Capillary refill takes less than 2 seconds.      Coloration: Skin is not jaundiced or pale.      Findings: No bruising, erythema, lesion or rash.   Neurological:      General: No focal deficit present.      Mental Status: She is alert and oriented to person, place, and time. Mental status is at baseline.      Sensory: No sensory deficit.      Motor: No weakness.   Psychiatric:         Mood and Affect: Mood normal.         Behavior: Behavior normal.         Thought Content: Thought content normal.                DIAGNOSTIC RESULTS     LABS:  Labs Reviewed   CBC WITH AUTO DIFFERENTIAL - Abnormal       Result Value    WBC 10.0      nRBC 0.0      RBC 4.59      Hemoglobin 13.7      Hematocrit 43.0      MCV 94      MCH 29.8      MCHC 31.9 (*)     RDW 13.3      Platelets 228      Neutrophils % 73.2      Immature Granulocytes %, Automated 0.3      Lymphocytes % 19.8      Monocytes % 5.3      Eosinophils % 0.9      Basophils % 0.5      Neutrophils Absolute 7.35 (*)     Immature Granulocytes Absolute, Automated 0.03      Lymphocytes Absolute 1.99      Monocytes Absolute  0.53      Eosinophils Absolute 0.09      Basophils Absolute 0.05     COMPREHENSIVE METABOLIC PANEL - Abnormal    Glucose 113 (*)     Sodium 141      Potassium 4.0      Chloride 110 (*)     Bicarbonate 23      Anion Gap 12      Urea Nitrogen 23      Creatinine 0.93      eGFR 57 (*)     Calcium 8.1 (*)     Albumin 3.6      Alkaline Phosphatase 33      Total Protein 5.8 (*)     AST 18      Bilirubin, Total 0.3      ALT 12     URINALYSIS WITH REFLEX CULTURE AND MICROSCOPIC - Abnormal    Color, Urine Yellow      Appearance, Urine Hazy (*)     Specific Gravity, Urine 1.025      pH, Urine 7.0      Protein, Urine NEGATIVE      Glucose, Urine NEGATIVE      Blood, Urine SMALL (1+) (*)     Ketones, Urine NEGATIVE      Bilirubin, Urine NEGATIVE      Urobilinogen, Urine <2.0      Nitrite, Urine POSITIVE (*)     Leukocyte Esterase, Urine SMALL (1+) (*)    MICROSCOPIC ONLY, URINE - Abnormal    WBC, Urine 11-20 (*)     RBC, Urine 6-10 (*)     Squamous Epithelial Cells, Urine 1-9 (SPARSE)      Bacteria, Urine 1+ (*)    LIPASE - Normal    Lipase 29      Narrative:     Venipuncture immediately after or during the administration of Metamizole may lead to falsely low results. Testing should be performed immediately prior to Metamizole dosing.   TROPONIN I, HIGH SENSITIVITY - Normal    Troponin I, High Sensitivity 10      Narrative:     Less than 99th percentile of normal range cutoff-  Female and children under 18 years old <14 ng/L; Male <21 ng/L: Negative  Repeat testing should be performed if clinically indicated.     Female and children under 18 years old 14-50 ng/L; Male 21-50 ng/L:  Consistent with possible cardiac damage and possible increased clinical   risk. Serial measurements may help to assess extent of myocardial damage.     >50 ng/L: Consistent with cardiac damage, increased clinical risk and  myocardial infarction. Serial measurements may help assess extent of   myocardial damage.      NOTE: Children less than 1 year old  may have higher baseline troponin   levels and results should be interpreted in conjunction with the overall   clinical context.     NOTE: Troponin I testing is performed using a different   testing methodology at [facility] than at other   [facility] hospitals. Direct result comparisons should only   be made within the same method.   URINE CULTURE   URINALYSIS WITH REFLEX CULTURE AND MICROSCOPIC    Narrative:     The following orders were created for panel order Urinalysis with Reflex Culture and Microscopic.  Procedure                               Abnormality         Status                     ---------                               -----------         ------                     Urinalysis with Reflex C...[[id number]]  Abnormal            Final result               Extra Urine Gray Tube[[id number]]                            Final result                 Please view results for these tests on the individual orders.   EXTRA URINE GRAY TUBE    Extra Tube Hold for add-ons.         All other labs were within normal range or not returned as of this dictation.    Imaging  CT angio chest abdomen pelvis   Final Result   Chest:   1. No aortic dissection or aneurysm. Severe atherosclerosis.   2. Small bowel obstruction secondary to strangulated right inguinal   hernia as above. The hernia is immediately superolateral to the   inferior epigastric artery. Surgical evaluation is recommended.   3. The distal appendix is dilated measuring 1.3 cm with some   nodularity of the appendiceal walls. A malignant process is not   excluded. Surgical evaluation may be considered. Appearance is not   typical for appendicitis.   4. Patchy airspace opacity within the posterior right upper lobe,   possibly reflecting pneumonia.   5. Web-like filling defects within the right lower lobe which may   reflect chronic thromboembolic disease. Borderline dilated pulmonary   artery which can be seen with pulmonary hypertension. Findings may be    seen with chronic thromboembolic pulmonary hypertension.   6.  Dilated biliary tree which may be secondary to prior   cholecystectomy. A component of acute variably obstruction is not   excluded and correlation with LFTs is recommended.   7. Scattered pulmonary nodules measuring up to 0.7 cm.   8. Clustered cystic lesion or lesions within the pancreatic tail   measuring approximately 3 x 1.9 cm. Follow-up MRI is recommended for   further evaluation.        MACRO:   This critical finding was communicated by Dr. Sanchez to VIRAJ MOLINA at 23:433/29/2024 with read back verification.        Signed by: Jim Sanchez 3/29/2024 11:44 PM   Dictation workstation:   [id number]           Procedures  Procedures     EMERGENCY DEPARTMENT COURSE/MDM:     Diagnoses as of 03/31/24 0515   Lower urinary tract infectious disease   Right lower quadrant abdominal pain   Inguinal hernia of right side with obstruction        Medical Decision Making  Patient is a 89-year-old female who presents to the ED with complaint of abdominal pain.  The patient is awake, alert, and oriented.  She is well-appearing, nontoxic, not in apparent distress.  Given her medical history and current presentation, will order labs and imaging to rule out possible abdominal hernia strangulation, diverticulitis, bowel ischemia, bowel perforation, bowel obstruction, or urinary tract infection.  CBC result is unremarkable.  Chemistry panel shows a GFR of 57, hyperchloremia with Cl of 110, and mild hyperglycemia.  Patient troponin level within normal ranges, and lipase is within normal range.  Patient tested positive for urinary tract infection on which urinalysis showing nitrite positive, elevated leukocyte, I and positive for bacteria and leukocyte esterase.      By my independent analysis, I agree with the radiologist interpretation of the CT angiogram showing severe atherosclerosis with no evidence of aortic dissection or aneurysm.  There is small  bowel obstruction secondary to strangulated right inguinal hernia, and 1.3 cm dilation of the distal appendix with nodularity on the appendiceal walls.  There is patchy airspace opacity within the posterior right upper lobe, possibly reflecting pneumonia.  Is a weblike filling defect within the right lower lobe which may reflect chronic thromboembolic disease with borderline dilated pulmonary artery, indicating possible chronic thromboembolic pulmonary hypertension.  There is dilated bilaterally with trace which may be secondary to prior cholecystectomy.  There are scattered pulmonary nodules measuring up to 0.7 cm.  And there are) lesion all lesions within the pancreatic tail measuring approximately 3 x 1.9 cm.     Patient brought signed DNR CC paperwork with her.  She is of decision-making capacity and is here with her son.  I did discuss with her that if she indeed does have a bowel obstruction secondary to incarcerated hernia that this could lead to life-threatening complications.  They do not want aggressive measures and are not interested in pursuing any surgical interventions, therefore surgery was not consulted.  At bedside felt like I was able to reduce the inguinal hernia without significant pain or discomfort from the patient.    Patient was given 4 mg of morphine for pain control, and 4 mg of Zofran for nausea and vomiting.  She received 5 mg of hydralazine for high blood pressure control.    The case was discussed with Dr. Wright who saw and evaluated patient at the bedside.  The patient was updated with labs and imaging results.  Patient will be admitted for palliative follow-up tomorrow.        Patient and or family in agreement and understanding of treatment plan.  All questions answered.      I reviewed the case with the attending ED physician. The attending ED physician agrees with the plan. Patient and/or patient´s representative was counseled regarding labs, imaging, likely diagnosis, and plan.  All questions were answered.    ED Medications administered this visit:    Medications   morphine injection 4 mg (4 mg intravenous Given 3/29/24 2208)   ondansetron (Zofran) injection 4 mg (4 mg intravenous Given 3/29/24 2209)   hydrALAZINE (Apresoline) injection 5 mg (5 mg intravenous Given 3/29/24 2225)   iohexol (OMNIPaque) 350 mg iodine/mL solution 90 mL (90 mL intravenous Given 3/29/24 2232)   cefTRIAXone (Rocephin) IVPB 1 g (0 g intravenous Stopped 3/30/24 0245)       New Prescriptions from this visit:    Discharge Medication List as of 3/30/2024  1:18 PM        START taking these medications    Details   cefdinir (Omnicef) 300 mg capsule Take 1 capsule (300 mg) by mouth 2 times a day for 5 days., Starting Sat 3/30/2024, Until Thu 4/4/2024, Normal      polyethylene glycol (Glycolax, Miralax) 17 gram packet Take 17 g by mouth once daily., Starting Sat 3/30/2024, Until Mon 4/29/2024, Normal      traMADol (Ultram) 50 mg tablet Take 1 tablet (50 mg) by mouth every 8 hours if needed for severe pain (7 - 10)., Starting Sat 3/30/2024, Normal             Follow-up:  Primary care provider (PCP)          Ara Pena, APRN-CNP  [id number] [address]  [phone]              Final Impression:   1. Lower urinary tract infectious disease    2. Right lower quadrant abdominal pain    3. Inguinal hernia of right side with obstruction          (Please note that portions of this note were completed with a voice recognition program.  Efforts were made to edit the dictations but occasionally words are mis-transcribed.)     Penelope Salvador MD  Resident  03/30/24 0335       Penelope Salvador MD  Resident  03/30/24 0337    The patient was seen by the resident/fellow.  I have personally performed a substantive portion of the encounter.  I have seen and examined the patient; agree with the workup, evaluation, MDM, management and diagnosis.  The care plan has been discussed with the resident; I have reviewed  the resident’s note and agree with the documented findings.       Benito Wright, DO  03/31/24 0516

## 2024-03-30 NOTE — CARE PLAN
The patient's goals for the shift include  patient will have decreased abd pain during hospital stay    The clinical goals for the shift include remain hemodynamically stable   NPO WITH NS RRUNNING AT 75ML/HR. IV PROTONIX GIVEN.

## 2024-03-30 NOTE — DISCHARGE SUMMARY
Discharge Diagnosis  Strangulated hernia of abdominal wall    Issues Requiring Follow-Up  Follow-up with primary care provider after discharge as needed    Discharge Meds     Your medication list        START taking these medications        Instructions Last Dose Given Next Dose Due   cefdinir 300 mg capsule  Commonly known as: Omnicef      Take 1 capsule (300 mg) by mouth 2 times a day for 5 days.       polyethylene glycol 17 gram packet  Commonly known as: Glycolax, Miralax      Take 17 g by mouth once daily.       traMADol 50 mg tablet  Commonly known as: Ultram      Take 1 tablet (50 mg) by mouth every 8 hours if needed for severe pain (7 - 10).              CONTINUE taking these medications        Instructions Last Dose Given Next Dose Due   acetaminophen 500 mg tablet  Commonly known as: Tylenol           albuterol 90 mcg/actuation inhaler           atorvastatin 20 mg tablet  Commonly known as: Lipitor           CENTRUM CHEWABLES ORAL           cranberry 400 mg capsule           diclofenac sodium 1 % gel  Commonly known as: Voltaren           estradiol 0.01 % (0.1 mg/gram) vaginal cream  Commonly known as: Estrace           lactobacillus acidophilus & bulgar 1 million cell chewable tablet  Commonly known as: Lactinex           levothyroxine 50 mcg tablet  Commonly known as: Synthroid, Levoxyl           lisinopril 40 mg tablet           liver oil-zinc oxide ointment  Commonly known as: Desitin           metoprolol succinate XL 50 mg 24 hr tablet  Commonly known as: Toprol-XL           NexIUM 20 mg DR capsule  Generic drug: esomeprazole           rivaroxaban 15 mg tablet  Commonly known as: Xarelto           Systane (PF) 0.4-0.3 % dropperette  Generic drug: peg 400-propylene glycol (PF)                     Where to Get Your Medications        These medications were sent to [id number] - [place], [place] - [address]  [address], [facility] [address]      Phone: [phone]    cefdinir 300 mg capsule  polyethylene glycol 17 gram packet  traMADol 50 mg tablet         Test Results Pending At Discharge  Pending Labs       Order Current Status    Urine Culture In process            Hospital Course  89-year-old female with past medical history of atrial fibrillation, hypertension, history of TIA, hypothyroidism, inguinal hernia presented to emergency department for abdominal pain.  Patient was found to have strangulated inguinal hernia that was reduced by ER.  Patient was admitted to hospitalist service.  She did not want any surgical or aggressive intervention.  She is feeling better and passing gas.  Patient and son Austin are at bedside and they want her going back to independent living facility and they will look into assisted living if patient has recurrence of her symptoms.  Patient is currently in stable condition we are prescribing her bowel regimen along with hernia belt and as needed pain medications.  Patient has a UTI and will complete the treatment for urinary tract infection with oral antibiotics as well.  Tenderness to palpation in she will follow-up with PCP as outpatient.    35 minutes spent in discharge timing    Pertinent Physical Exam At Time of Discharge  General: Not in acute distress, alert  HEENT: PERRLA, head intact and normocephalic  Neck: Normal to inspection  Lungs: Clear to auscultation, work of breathing within normal limit  Cardiac: Regular rate and rhythm  Abdomen: Soft left lower quadrant region without rebound or rigidity.  Positive bowel sounds  : Exam deferred  Skin: Intact  Hematology: No petechia or excessive ecchymosis  Musculoskeletal: Without significant trauma  Neurological: Alert awake oriented, no focal deficit, cranial nerves grossly intact  Psych: No suicidal ideation or homicidal ideation    Outpatient Follow-Up  No future appointments.      Damien Elmore MD

## 2024-04-01 LAB
BACTERIA UR CULT: ABNORMAL
BACTERIA UR CULT: ABNORMAL

## 2024-08-26 ENCOUNTER — APPOINTMENT (OUTPATIENT)
Dept: RADIOLOGY | Facility: HOSPITAL | Age: 89
End: 2024-08-26
Payer: MEDICARE

## 2024-08-26 ENCOUNTER — HOSPITAL ENCOUNTER (EMERGENCY)
Facility: HOSPITAL | Age: 89
Discharge: HOME | End: 2024-08-27
Attending: STUDENT IN AN ORGANIZED HEALTH CARE EDUCATION/TRAINING PROGRAM
Payer: MEDICARE

## 2024-08-26 DIAGNOSIS — R10.84 GENERALIZED ABDOMINAL PAIN: Primary | ICD-10-CM

## 2024-08-26 DIAGNOSIS — N39.0 UTI (URINARY TRACT INFECTION), UNCOMPLICATED: ICD-10-CM

## 2024-08-26 LAB
ABO GROUP (TYPE) IN BLOOD: NORMAL
ALBUMIN SERPL BCP-MCNC: 4 G/DL (ref 3.4–5)
ALP SERPL-CCNC: 38 U/L (ref 33–136)
ALT SERPL W P-5'-P-CCNC: 12 U/L (ref 7–45)
ANION GAP SERPL CALC-SCNC: 11 MMOL/L (ref 10–20)
ANTIBODY SCREEN: NORMAL
APPEARANCE UR: ABNORMAL
APTT PPP: 33 SECONDS (ref 27–38)
AST SERPL W P-5'-P-CCNC: 21 U/L (ref 9–39)
BACTERIA #/AREA URNS AUTO: ABNORMAL /HPF
BASOPHILS # BLD AUTO: 0.04 X10*3/UL (ref 0–0.1)
BASOPHILS NFR BLD AUTO: 0.5 %
BB ANTIBODY IDENTIFICATION: NORMAL
BILIRUB SERPL-MCNC: 0.5 MG/DL (ref 0–1.2)
BILIRUB UR STRIP.AUTO-MCNC: NEGATIVE MG/DL
BUN SERPL-MCNC: 26 MG/DL (ref 6–23)
CALCIUM SERPL-MCNC: 9.2 MG/DL (ref 8.6–10.3)
CASE #: NORMAL
CHLORIDE SERPL-SCNC: 107 MMOL/L (ref 98–107)
CO2 SERPL-SCNC: 25 MMOL/L (ref 21–32)
COLOR UR: ABNORMAL
CREAT SERPL-MCNC: 1.09 MG/DL (ref 0.5–1.05)
EGFRCR SERPLBLD CKD-EPI 2021: 47 ML/MIN/1.73M*2
EOSINOPHIL # BLD AUTO: 0.1 X10*3/UL (ref 0–0.4)
EOSINOPHIL NFR BLD AUTO: 1.3 %
ERYTHROCYTE [DISTWIDTH] IN BLOOD BY AUTOMATED COUNT: 13.4 % (ref 11.5–14.5)
GLUCOSE SERPL-MCNC: 146 MG/DL (ref 74–99)
GLUCOSE UR STRIP.AUTO-MCNC: NORMAL MG/DL
HCT VFR BLD AUTO: 42.9 % (ref 36–46)
HGB BLD-MCNC: 13.5 G/DL (ref 12–16)
HOLD SPECIMEN: NORMAL
IMM GRANULOCYTES # BLD AUTO: 0.01 X10*3/UL (ref 0–0.5)
IMM GRANULOCYTES NFR BLD AUTO: 0.1 % (ref 0–0.9)
INR PPP: 1.2 (ref 0.9–1.1)
KETONES UR STRIP.AUTO-MCNC: NEGATIVE MG/DL
LACTATE SERPL-SCNC: 1.2 MMOL/L (ref 0.4–2)
LEUKOCYTE ESTERASE UR QL STRIP.AUTO: ABNORMAL
LIPASE SERPL-CCNC: 31 U/L (ref 9–82)
LYMPHOCYTES # BLD AUTO: 1.18 X10*3/UL (ref 0.8–3)
LYMPHOCYTES NFR BLD AUTO: 15.9 %
MCH RBC QN AUTO: 29.7 PG (ref 26–34)
MCHC RBC AUTO-ENTMCNC: 31.5 G/DL (ref 32–36)
MCV RBC AUTO: 94 FL (ref 80–100)
MONOCYTES # BLD AUTO: 0.39 X10*3/UL (ref 0.05–0.8)
MONOCYTES NFR BLD AUTO: 5.3 %
MUCOUS THREADS #/AREA URNS AUTO: ABNORMAL /LPF
NEUTROPHILS # BLD AUTO: 5.69 X10*3/UL (ref 1.6–5.5)
NEUTROPHILS NFR BLD AUTO: 76.9 %
NITRITE UR QL STRIP.AUTO: NEGATIVE
NRBC BLD-RTO: 0 /100 WBCS (ref 0–0)
PH UR STRIP.AUTO: 5.5 [PH]
PLATELET # BLD AUTO: 254 X10*3/UL (ref 150–450)
POTASSIUM SERPL-SCNC: 4.4 MMOL/L (ref 3.5–5.3)
PROT SERPL-MCNC: 6.7 G/DL (ref 6.4–8.2)
PROT UR STRIP.AUTO-MCNC: ABNORMAL MG/DL
PROTHROMBIN TIME: 13.4 SECONDS (ref 9.8–12.8)
RBC # BLD AUTO: 4.55 X10*6/UL (ref 4–5.2)
RBC # UR STRIP.AUTO: ABNORMAL /UL
RBC #/AREA URNS AUTO: ABNORMAL /HPF
RH FACTOR (ANTIGEN D): NORMAL
SODIUM SERPL-SCNC: 139 MMOL/L (ref 136–145)
SP GR UR STRIP.AUTO: 1.01
SQUAMOUS #/AREA URNS AUTO: ABNORMAL /HPF
UROBILINOGEN UR STRIP.AUTO-MCNC: NORMAL MG/DL
WBC # BLD AUTO: 7.4 X10*3/UL (ref 4.4–11.3)
WBC #/AREA URNS AUTO: ABNORMAL /HPF
WBC CLUMPS #/AREA URNS AUTO: ABNORMAL /HPF

## 2024-08-26 PROCEDURE — 74177 CT ABD & PELVIS W/CONTRAST: CPT | Mod: FOREIGN READ | Performed by: RADIOLOGY

## 2024-08-26 PROCEDURE — 96374 THER/PROPH/DIAG INJ IV PUSH: CPT | Mod: 59

## 2024-08-26 PROCEDURE — 86901 BLOOD TYPING SEROLOGIC RH(D): CPT | Performed by: PHYSICIAN ASSISTANT

## 2024-08-26 PROCEDURE — 2500000002 HC RX 250 W HCPCS SELF ADMINISTERED DRUGS (ALT 637 FOR MEDICARE OP, ALT 636 FOR OP/ED): Performed by: PHYSICIAN ASSISTANT

## 2024-08-26 PROCEDURE — 85610 PROTHROMBIN TIME: CPT | Performed by: PHYSICIAN ASSISTANT

## 2024-08-26 PROCEDURE — 81001 URINALYSIS AUTO W/SCOPE: CPT | Performed by: STUDENT IN AN ORGANIZED HEALTH CARE EDUCATION/TRAINING PROGRAM

## 2024-08-26 PROCEDURE — 85025 COMPLETE CBC W/AUTO DIFF WBC: CPT | Performed by: STUDENT IN AN ORGANIZED HEALTH CARE EDUCATION/TRAINING PROGRAM

## 2024-08-26 PROCEDURE — 86870 RBC ANTIBODY IDENTIFICATION: CPT

## 2024-08-26 PROCEDURE — 96375 TX/PRO/DX INJ NEW DRUG ADDON: CPT | Mod: 59

## 2024-08-26 PROCEDURE — 74177 CT ABD & PELVIS W/CONTRAST: CPT

## 2024-08-26 PROCEDURE — 83690 ASSAY OF LIPASE: CPT | Performed by: STUDENT IN AN ORGANIZED HEALTH CARE EDUCATION/TRAINING PROGRAM

## 2024-08-26 PROCEDURE — 87186 SC STD MICRODIL/AGAR DIL: CPT | Mod: STJLAB | Performed by: STUDENT IN AN ORGANIZED HEALTH CARE EDUCATION/TRAINING PROGRAM

## 2024-08-26 PROCEDURE — 2550000001 HC RX 255 CONTRASTS: Performed by: STUDENT IN AN ORGANIZED HEALTH CARE EDUCATION/TRAINING PROGRAM

## 2024-08-26 PROCEDURE — 99284 EMERGENCY DEPT VISIT MOD MDM: CPT

## 2024-08-26 PROCEDURE — 36415 COLL VENOUS BLD VENIPUNCTURE: CPT | Performed by: STUDENT IN AN ORGANIZED HEALTH CARE EDUCATION/TRAINING PROGRAM

## 2024-08-26 PROCEDURE — 80053 COMPREHEN METABOLIC PANEL: CPT | Performed by: STUDENT IN AN ORGANIZED HEALTH CARE EDUCATION/TRAINING PROGRAM

## 2024-08-26 PROCEDURE — 83605 ASSAY OF LACTIC ACID: CPT | Performed by: PHYSICIAN ASSISTANT

## 2024-08-26 PROCEDURE — 86902 BLOOD TYPE ANTIGEN DONOR EA: CPT

## 2024-08-26 PROCEDURE — 36415 COLL VENOUS BLD VENIPUNCTURE: CPT | Performed by: PHYSICIAN ASSISTANT

## 2024-08-26 PROCEDURE — 2500000004 HC RX 250 GENERAL PHARMACY W/ HCPCS (ALT 636 FOR OP/ED): Performed by: PHYSICIAN ASSISTANT

## 2024-08-26 RX ORDER — NITROFURANTOIN 25; 75 MG/1; MG/1
100 CAPSULE ORAL ONCE
Status: COMPLETED | OUTPATIENT
Start: 2024-08-26 | End: 2024-08-26

## 2024-08-26 RX ORDER — ONDANSETRON HYDROCHLORIDE 2 MG/ML
4 INJECTION, SOLUTION INTRAVENOUS ONCE
Status: COMPLETED | OUTPATIENT
Start: 2024-08-26 | End: 2024-08-26

## 2024-08-26 RX ORDER — MORPHINE SULFATE 2 MG/ML
2 INJECTION, SOLUTION INTRAMUSCULAR; INTRAVENOUS ONCE
Status: COMPLETED | OUTPATIENT
Start: 2024-08-26 | End: 2024-08-26

## 2024-08-26 ASSESSMENT — PAIN DESCRIPTION - PAIN TYPE
TYPE: ACUTE PAIN
TYPE: ACUTE PAIN

## 2024-08-26 ASSESSMENT — COLUMBIA-SUICIDE SEVERITY RATING SCALE - C-SSRS
1. IN THE PAST MONTH, HAVE YOU WISHED YOU WERE DEAD OR WISHED YOU COULD GO TO SLEEP AND NOT WAKE UP?: NO
2. HAVE YOU ACTUALLY HAD ANY THOUGHTS OF KILLING YOURSELF?: NO
6. HAVE YOU EVER DONE ANYTHING, STARTED TO DO ANYTHING, OR PREPARED TO DO ANYTHING TO END YOUR LIFE?: NO

## 2024-08-26 ASSESSMENT — PAIN DESCRIPTION - LOCATION
LOCATION: ABDOMEN
LOCATION: ABDOMEN

## 2024-08-26 ASSESSMENT — PAIN SCALES - GENERAL
PAINLEVEL_OUTOF10: 2
PAINLEVEL_OUTOF10: 4

## 2024-08-26 ASSESSMENT — PAIN - FUNCTIONAL ASSESSMENT
PAIN_FUNCTIONAL_ASSESSMENT: 0-10
PAIN_FUNCTIONAL_ASSESSMENT: 0-10

## 2024-08-27 VITALS
HEIGHT: 66 IN | WEIGHT: 145 LBS | SYSTOLIC BLOOD PRESSURE: 187 MMHG | DIASTOLIC BLOOD PRESSURE: 77 MMHG | OXYGEN SATURATION: 96 % | RESPIRATION RATE: 18 BRPM | TEMPERATURE: 97.3 F | HEART RATE: 60 BPM | BODY MASS INDEX: 23.3 KG/M2

## 2024-08-27 LAB — HOLD SPECIMEN: NORMAL

## 2024-08-27 RX ORDER — NITROFURANTOIN 25; 75 MG/1; MG/1
100 CAPSULE ORAL 2 TIMES DAILY
Qty: 10 CAPSULE | Refills: 0 | Status: SHIPPED | OUTPATIENT
Start: 2024-08-27 | End: 2024-09-01

## 2024-08-27 NOTE — ED PROVIDER NOTES
"This is a 89 yo female with a pmh of bilateral inguinal hernia, esophageal stricture, chronic kidney disease, recurrent UTI, hypertension, hyperlipidemia, atrial fibrillation(on xarelto), hypothyroidism, TIA, and chronic low back pain who presents to the ED for abdominal pain.  She states the abdominal pain started suddenly earlier today and radiated towards her groin, specifically towards her right inguinal hernia.  She describes a cramping sensation.  She endorses associated nausea at the time but denies any nausea or vomiting currently.  She says that she did have a bowel movement this morning.  She has been able to pass gas since it started.  She denies any urinary symptoms but does endorse a history of frequent UTIs.  She denies any fevers or chills.  She denies any known sick contacts.  She states that she is currently living in an assisted living facility.  She does confirm that she is a DNR CC      History provided by:  Patient   used: No             Visit Vitals  /67 (BP Location: Right arm, Patient Position: Lying)   Pulse 53   Temp 36.3 °C (97.3 °F)   Resp 18   Ht 1.676 m (5' 6\")   Wt 65.8 kg (145 lb)   SpO2 95%   BMI 23.40 kg/m²   Smoking Status Never   BSA 1.75 m²          Physical Exam     Physical exam:   General: Vitals noted, no distress. Afebrile.   EENT:  Hearing grossly intact. Normal phonation. MMM. Airway patient. PERRL. EOMI.   Neck: No midline tenderness or paraspinal tenderness. FROM.   Cardiac: Regular, rate, rhythm. Normal S1 and S2.  No murmurs, gallops, rubs.   Pulmonary: Good air exchange. Lungs clear bilaterally. No wheezes, rhonchi, rales. No accessory muscle use.   Abdomen: Soft, nonsurgical.  Diffuse abdominal tenderness palpation, worse to the suprapubic region.  Bilateral inguinal hernias are easily reducible at bedside. No peritoneal signs. Normoactive bowel sounds.   Back: No CVA tenderness. No midline tenderness or paraspinal tenderness. No obvious " deformity.   Extremities: No peripheral edema.  Full range of motion. Moves all extremities freely. No tenderness throughout extremities.   Skin: No rash. Warm and Dry.   Neuro: No focal neurologic deficits. CN 2-12 grossly intact. Sensation equal bilaterally. No weakness.         Labs Reviewed   CBC WITH AUTO DIFFERENTIAL - Abnormal       Result Value    WBC 7.4      nRBC 0.0      RBC 4.55      Hemoglobin 13.5      Hematocrit 42.9      MCV 94      MCH 29.7      MCHC 31.5 (*)     RDW 13.4      Platelets 254      Neutrophils % 76.9      Immature Granulocytes %, Automated 0.1      Lymphocytes % 15.9      Monocytes % 5.3      Eosinophils % 1.3      Basophils % 0.5      Neutrophils Absolute 5.69 (*)     Immature Granulocytes Absolute, Automated 0.01      Lymphocytes Absolute 1.18      Monocytes Absolute 0.39      Eosinophils Absolute 0.10      Basophils Absolute 0.04     COMPREHENSIVE METABOLIC PANEL - Abnormal    Glucose 146 (*)     Sodium 139      Potassium 4.4      Chloride 107      Bicarbonate 25      Anion Gap 11      Urea Nitrogen 26 (*)     Creatinine 1.09 (*)     eGFR 47 (*)     Calcium 9.2      Albumin 4.0      Alkaline Phosphatase 38      Total Protein 6.7      AST 21      Bilirubin, Total 0.5      ALT 12     URINALYSIS WITH REFLEX CULTURE AND MICROSCOPIC - Abnormal    Color, Urine Light-Yellow      Appearance, Urine Turbid (*)     Specific Gravity, Urine 1.012      pH, Urine 5.5      Protein, Urine 10 (TRACE)      Glucose, Urine Normal      Blood, Urine 0.5 (2+) (*)     Ketones, Urine NEGATIVE      Bilirubin, Urine NEGATIVE      Urobilinogen, Urine Normal      Nitrite, Urine NEGATIVE      Leukocyte Esterase, Urine 250 Luisa/µL (*)    COAGULATION SCREEN - Abnormal    Protime 13.4 (*)     INR 1.2 (*)     aPTT 33      Narrative:     The APTT is no longer used for monitoring Unfractionated Heparin Therapy. For monitoring Heparin Therapy, use the Heparin Assay.   MICROSCOPIC ONLY, URINE - Abnormal    WBC, Urine  21-50 (*)     WBC Clumps, Urine OCCASIONAL      RBC, Urine 11-20 (*)     Squamous Epithelial Cells, Urine 1-9 (SPARSE)      Bacteria, Urine 3+ (*)     Mucus, Urine FEW     LIPASE - Normal    Lipase 31      Narrative:     Venipuncture immediately after or during the administration of Metamizole may lead to falsely low results. Testing should be performed immediately prior to Metamizole dosing.   LACTATE - Normal    Lactate 1.2      Narrative:     Venipuncture immediately after or during the administration of Metamizole may lead to falsely low results. Testing should be performed immediately  prior to Metamizole dosing.   URINE CULTURE   TYPE AND SCREEN    ABO TYPE A      Rh TYPE NEG      ANTIBODY SCREEN POS     URINALYSIS WITH REFLEX CULTURE AND MICROSCOPIC    Narrative:     The following orders were created for panel order Urinalysis with Reflex Culture and Microscopic.  Procedure                               Abnormality         Status                     ---------                               -----------         ------                     Urinalysis with Reflex C...[[id number]]  Abnormal            Final result               Extra Urine Gray Tube[[id number]]                            In process                   Please view results for these tests on the individual orders.   EXTRA URINE GRAY TUBE   BB ORDER ONLY - ANTIBODY IDENTIFICATION       CT abdomen pelvis w IV contrast    (Results Pending)           ED Course & MDM     Medical Decision Making  This is a 89-year-old female with past medical history of bilateral inguinal hernias as well as recurrent UTIs, hypertension, hyperlipidemia, osteoarthritis, CKD, A-fib on Xarelto who presents to the ED with abdominal pain.  Vitals that show she was hypertensive at 198/81.  She denied any headache, chest pain, or shortness of breath.  Vitals otherwise within normal limits.  On physical examination she was neurologically intact without deficits.  Abdomen soft but  diffusely tender to palpation, worse in the suprapubic region.  She did have bilateral inguinal hernias that were easily reducible.  No CVA tenderness.  IV established laboratory studies obtained.  CT abdomen pelvis ordered.  Patient initially medicated with 2 mg of IV morphine as she has been able to tolerate this in the past as well as Zofran for nausea.  Patient's urinalysis was concerning for UTI and she was ordered 1 dose of oral Macrobid here in the ED. patient's laboratory studies showed her creatinine is 1.09, this appears to be Fairly consistent to baseline.  Normal lipase.  Normal LFTs.  CBC grossly unremarkable.  Patient signed out to attending pending CT abdomen pelvis as well as reevaluation.    Amount and/or Complexity of Data Reviewed  Labs: ordered.  Radiology: ordered.         ED Course as of 08/26/24 2203   Mon Aug 26, 2024   2039 Right inguinal hernia easily reducible on exam.  [TL]   2040 Patient with UTI as well. [TL]   2159 BP improved without intervention. Patient resting comfortably on reassessment.  [TL]      ED Course User Index  [TL] John Cochran DO       This was a shared visit with an ED attending.  The patient was seen and discussed with the ED attending    Procedures    HEBER Ramirez, DIOGENES Angel PA-C  08/26/24 2204

## 2024-08-27 NOTE — DISCHARGE INSTRUCTIONS
Follow-up with your primary care physician.  Take medications as prescribed.  Seek immediate medical attention with any worsening symptoms, inability tolerate oral intake, fevers or for any reason.

## 2024-08-29 LAB — BACTERIA UR CULT: ABNORMAL

## 2024-08-31 ENCOUNTER — TELEPHONE (OUTPATIENT)
Dept: PHARMACY | Facility: HOSPITAL | Age: 89
End: 2024-08-31
Payer: MEDICARE

## 2024-08-31 NOTE — PROGRESS NOTES
EDPD Note: Antibiotics Reviewed and Warranted    Contacted Mr./Mrs./Ms. Betty Griffin regarding a positive urine culture/result that was taken during their recent emergency room visit. I completed education with son Austin today who returned call from earlier today. The patient completed 5-day course with Macrobid 100 mg twice daily which showed intermediate susceptibility. In the ED patient did not endorse any urinary symptoms. Today he confirms that patient does not have any urinary symptoms.     He reports that she does get recurrent UTIs and is working with her PCP Ara Pena, on treatment to help reduce frequency. They did share culture results with PCP's office already.     At this time no further treatment is needed and patient will follow-up with PCP.     Susceptibility data from last 90 days.  Collected Specimen Info Organism Amoxicillin/Clavulanate Ampicillin Ampicillin/Sulbactam Cefazolin Ciprofloxacin Gentamicin Nitrofurantoin Piperacillin/Tazobactam Trimethoprim/Sulfamethoxazole   08/26/24 Urine from Clean Catch/Voided Klebsiella oxytoca/Raoultella species  S  R  S  S  R  S  I  S  R      No further follow up needed from EDPD Team.     Ameya Salcedo, PharmD

## 2025-08-19 ENCOUNTER — APPOINTMENT (OUTPATIENT)
Dept: CARDIOLOGY | Facility: HOSPITAL | Age: OVER 89
End: 2025-08-19
Payer: MEDICARE

## 2025-08-19 ENCOUNTER — APPOINTMENT (OUTPATIENT)
Dept: RADIOLOGY | Facility: HOSPITAL | Age: OVER 89
End: 2025-08-19
Payer: MEDICARE

## 2025-08-19 ENCOUNTER — HOSPITAL ENCOUNTER (EMERGENCY)
Facility: HOSPITAL | Age: OVER 89
Discharge: HOME | End: 2025-08-19
Attending: EMERGENCY MEDICINE
Payer: MEDICARE

## 2025-08-19 VITALS
BODY MASS INDEX: 22.76 KG/M2 | RESPIRATION RATE: 12 BRPM | DIASTOLIC BLOOD PRESSURE: 70 MMHG | SYSTOLIC BLOOD PRESSURE: 174 MMHG | OXYGEN SATURATION: 95 % | HEART RATE: 58 BPM | TEMPERATURE: 98.1 F | WEIGHT: 145 LBS | HEIGHT: 67 IN

## 2025-08-19 DIAGNOSIS — R47.01 EXPRESSIVE APHASIA: ICD-10-CM

## 2025-08-19 DIAGNOSIS — N39.0 URINARY TRACT INFECTION WITHOUT HEMATURIA, SITE UNSPECIFIED: ICD-10-CM

## 2025-08-19 DIAGNOSIS — R40.4 TRANSIENT ALTERATION OF AWARENESS: Primary | ICD-10-CM

## 2025-08-19 PROBLEM — N18.31 STAGE 3A CHRONIC KIDNEY DISEASE (MULTI): Status: ACTIVE | Noted: 2021-06-30

## 2025-08-19 LAB
ANION GAP SERPL CALC-SCNC: 11 MMOL/L (ref 10–20)
APPEARANCE UR: CLEAR
ATRIAL RATE: 59 BPM
BACTERIA #/AREA URNS AUTO: ABNORMAL /HPF
BASOPHILS # BLD AUTO: 0.06 X10*3/UL (ref 0–0.1)
BASOPHILS NFR BLD AUTO: 0.6 %
BILIRUB UR STRIP.AUTO-MCNC: NEGATIVE MG/DL
BUN SERPL-MCNC: 35 MG/DL (ref 6–23)
CALCIUM SERPL-MCNC: 8.9 MG/DL (ref 8.6–10.3)
CHLORIDE SERPL-SCNC: 107 MMOL/L (ref 98–107)
CO2 SERPL-SCNC: 27 MMOL/L (ref 21–32)
COLOR UR: COLORLESS
CREAT SERPL-MCNC: 1.12 MG/DL (ref 0.5–1.05)
EGFRCR SERPLBLD CKD-EPI 2021: 45 ML/MIN/1.73M*2
EOSINOPHIL # BLD AUTO: 0.08 X10*3/UL (ref 0–0.4)
EOSINOPHIL NFR BLD AUTO: 0.8 %
ERYTHROCYTE [DISTWIDTH] IN BLOOD BY AUTOMATED COUNT: 14.1 % (ref 11.5–14.5)
GLUCOSE SERPL-MCNC: 92 MG/DL (ref 74–99)
GLUCOSE UR STRIP.AUTO-MCNC: NORMAL MG/DL
HCT VFR BLD AUTO: 40.3 % (ref 36–46)
HGB BLD-MCNC: 12.6 G/DL (ref 12–16)
IMM GRANULOCYTES # BLD AUTO: 0.02 X10*3/UL (ref 0–0.5)
IMM GRANULOCYTES NFR BLD AUTO: 0.2 % (ref 0–0.9)
KETONES UR STRIP.AUTO-MCNC: NEGATIVE MG/DL
LEUKOCYTE ESTERASE UR QL STRIP.AUTO: ABNORMAL
LYMPHOCYTES # BLD AUTO: 1.77 X10*3/UL (ref 0.8–3)
LYMPHOCYTES NFR BLD AUTO: 18 %
MCH RBC QN AUTO: 29.4 PG (ref 26–34)
MCHC RBC AUTO-ENTMCNC: 31.3 G/DL (ref 32–36)
MCV RBC AUTO: 94 FL (ref 80–100)
MONOCYTES # BLD AUTO: 0.47 X10*3/UL (ref 0.05–0.8)
MONOCYTES NFR BLD AUTO: 4.8 %
MUCOUS THREADS #/AREA URNS AUTO: ABNORMAL /LPF
NEUTROPHILS # BLD AUTO: 7.44 X10*3/UL (ref 1.6–5.5)
NEUTROPHILS NFR BLD AUTO: 75.6 %
NITRITE UR QL STRIP.AUTO: NEGATIVE
NRBC BLD-RTO: 0 /100 WBCS (ref 0–0)
P AXIS: 59 DEGREES
P OFFSET: 209 MS
P ONSET: 133 MS
PH UR STRIP.AUTO: 5.5 [PH]
PLATELET # BLD AUTO: 275 X10*3/UL (ref 150–450)
POTASSIUM SERPL-SCNC: 4.5 MMOL/L (ref 3.5–5.3)
PR INTERVAL: 180 MS
PROT UR STRIP.AUTO-MCNC: NEGATIVE MG/DL
Q ONSET: 223 MS
QRS COUNT: 10 BEATS
QRS DURATION: 82 MS
QT INTERVAL: 436 MS
QTC CALCULATION(BAZETT): 431 MS
QTC FREDERICIA: 433 MS
R AXIS: -5 DEGREES
RBC # BLD AUTO: 4.28 X10*6/UL (ref 4–5.2)
RBC # UR STRIP.AUTO: ABNORMAL MG/DL
RBC #/AREA URNS AUTO: ABNORMAL /HPF
SODIUM SERPL-SCNC: 140 MMOL/L (ref 136–145)
SP GR UR STRIP.AUTO: 1.01
SQUAMOUS #/AREA URNS AUTO: ABNORMAL /HPF
T AXIS: 46 DEGREES
T OFFSET: 441 MS
UROBILINOGEN UR STRIP.AUTO-MCNC: NORMAL MG/DL
VENTRICULAR RATE: 59 BPM
WBC # BLD AUTO: 9.8 X10*3/UL (ref 4.4–11.3)
WBC #/AREA URNS AUTO: ABNORMAL /HPF

## 2025-08-19 PROCEDURE — 80048 BASIC METABOLIC PNL TOTAL CA: CPT | Performed by: EMERGENCY MEDICINE

## 2025-08-19 PROCEDURE — 81001 URINALYSIS AUTO W/SCOPE: CPT | Performed by: EMERGENCY MEDICINE

## 2025-08-19 PROCEDURE — 99284 EMERGENCY DEPT VISIT MOD MDM: CPT | Performed by: EMERGENCY MEDICINE

## 2025-08-19 PROCEDURE — 93005 ELECTROCARDIOGRAM TRACING: CPT

## 2025-08-19 PROCEDURE — 87077 CULTURE AEROBIC IDENTIFY: CPT | Mod: STJLAB | Performed by: EMERGENCY MEDICINE

## 2025-08-19 PROCEDURE — 80048 BASIC METABOLIC PNL TOTAL CA: CPT | Performed by: STUDENT IN AN ORGANIZED HEALTH CARE EDUCATION/TRAINING PROGRAM

## 2025-08-19 PROCEDURE — 85025 COMPLETE CBC W/AUTO DIFF WBC: CPT | Performed by: STUDENT IN AN ORGANIZED HEALTH CARE EDUCATION/TRAINING PROGRAM

## 2025-08-19 PROCEDURE — 85025 COMPLETE CBC W/AUTO DIFF WBC: CPT | Performed by: EMERGENCY MEDICINE

## 2025-08-19 PROCEDURE — 99284 EMERGENCY DEPT VISIT MOD MDM: CPT | Mod: 25 | Performed by: EMERGENCY MEDICINE

## 2025-08-19 PROCEDURE — 96365 THER/PROPH/DIAG IV INF INIT: CPT

## 2025-08-19 PROCEDURE — 2500000004 HC RX 250 GENERAL PHARMACY W/ HCPCS (ALT 636 FOR OP/ED): Performed by: EMERGENCY MEDICINE

## 2025-08-19 PROCEDURE — 36415 COLL VENOUS BLD VENIPUNCTURE: CPT | Performed by: STUDENT IN AN ORGANIZED HEALTH CARE EDUCATION/TRAINING PROGRAM

## 2025-08-19 RX ORDER — CEPHALEXIN 500 MG/1
500 CAPSULE ORAL 2 TIMES DAILY
Qty: 14 CAPSULE | Refills: 0 | Status: SHIPPED | OUTPATIENT
Start: 2025-08-19 | End: 2025-08-27

## 2025-08-19 RX ORDER — CEFTRIAXONE 1 G/50ML
1 INJECTION, SOLUTION INTRAVENOUS ONCE
Status: COMPLETED | OUTPATIENT
Start: 2025-08-19 | End: 2025-08-19

## 2025-08-19 RX ADMIN — CEFTRIAXONE 1 G: 1 INJECTION, SOLUTION INTRAVENOUS at 16:38

## 2025-08-19 ASSESSMENT — PAIN SCALES - GENERAL
PAINLEVEL_OUTOF10: 0 - NO PAIN
PAINLEVEL_OUTOF10: 3

## 2025-08-19 ASSESSMENT — PAIN - FUNCTIONAL ASSESSMENT: PAIN_FUNCTIONAL_ASSESSMENT: 0-10

## 2025-08-20 LAB
HOLD SPECIMEN: NORMAL

## 2025-08-22 LAB — BACTERIA UR CULT: ABNORMAL

## 2025-08-23 ENCOUNTER — RESULTS FOLLOW-UP (OUTPATIENT)
Dept: PHARMACY | Facility: HOSPITAL | Age: OVER 89
End: 2025-08-23
Payer: MEDICARE